# Patient Record
Sex: MALE | Race: WHITE
[De-identification: names, ages, dates, MRNs, and addresses within clinical notes are randomized per-mention and may not be internally consistent; named-entity substitution may affect disease eponyms.]

---

## 2018-03-09 ENCOUNTER — HOSPITAL ENCOUNTER (INPATIENT)
Dept: HOSPITAL 69 - ER | Age: 68
LOS: 3 days | Discharge: HOME HEALTH | DRG: 481 | End: 2018-03-12
Attending: INTERNAL MEDICINE | Admitting: NURSE PRACTITIONER
Payer: COMMERCIAL

## 2018-03-09 LAB
ALBUMIN   *: 4.2 GM/DL (ref 3.4–5)
ALKALINE PHOSPHATASE     *: 84 U/L (ref 50–170)
ALT SERPL-CCNC: 34 U/L (ref 19–67)
ANION GAP SERPL CALC-SCNC: 13 MMOL/L (ref 6.8–13.8)
AST SERPL-CCNC: 20 U/L (ref 0–48)
BILIRUB SERPL-MCNC: 0.9 MG/DL (ref 0–1.1)
BUN SERPL-MCNC: 13 MG/DL (ref 6–23)
BUN/CREAT SERPL: 12.6 (ref 9–21.6)
CA. CORRECTED FOR ALBUMIN: 8.6 MG/DL (ref 8.4–10.2)
CALCIUM       *: 9.1 MG/DL (ref 7.9–10.9)
CHLORIDE SERPL-SCNC: 101 MMOL/L (ref 97–106)
CO2 SERPL-SCNC: 30.4 MMOL/L (ref 24–32.6)
GLUCOSE   *: 113 MG/DL (ref 70–110)
HCT VFR BLD CALC: 43.3 % (ref 42–52)
HGB BLD-MCNC: 15.1 GM/DL (ref 13.5–18)
MCH RBC QN AUTO: 31.9 PG (ref 27–31)
NRBC#: 0 K/MM3 (ref 0–1)
PLATELET # BLD: 259 K/MM3 (ref 150–450)
POTASSIUM SERPL-SCNC: 4.4 MMOL/L (ref 3.4–4.6)
PROT SERPL-MCNC: 7.7 GM/DL (ref 6.2–8.2)
RBC # BLD AUTO: 4.74 M/MM3 (ref 4.7–6)
SODIUM SERPL-SCNC: 140 MMOL/L (ref 132–142)
WBC # BLD AUTO: 13.8 K/MM3 (ref 4–10.5)

## 2018-03-09 RX ADMIN — AMITRIPTYLINE HYDROCHLORIDE SCH MG: 10 TABLET, FILM COATED ORAL at 22:30

## 2018-03-09 NOTE — HP
Chief Complaint





- Chief Complaint


Date of Service: 18


Time of Service: 21:12


Chief Complaint: "Fall, RT hip pain".  Source of HPI- Pt; reliable, ERP report.


History of Present Illness: 





Mr. Pederson is a 67-yr-old WM pt whose PMH is significant for:Kidney Stones & 

Multiple Sclerosis. Pt states that lost his balance and fell while pivoting 

from his wheel chair to the toilet on 3/08/18 at approximately 10.00 am. He 

landed on his RT hip during the fall. He was able to get assistance by family 

but did not seek medical care immediately despite having pain on his RT hip. He 

states that the pain kept getting worse and family convinced him to be taken to 

ER. He went to [facility] on 3/09 where imaging test showed he had a non-displaced 

intertrochanteric RT femur fracture. He was referred to our Othopedic service 

and Dr. Atkins evaluated the pt at the ED, with the plan to proceed with 

Surgery on 3/10 following surgical clearance. Pt has been paraplegic for the 

past 5 yrs due to MS and gets around with a powered wheel chair. Laboratory 

studies at the ED were unremarkable. The EKG showed NSR with no ST-T wave 

changes.The CXR did not have any acute findings. He will be admitted inpatient 

due to surgical procedure that requires preoperative and post-operative cares. 











- Patient's Past Medical History


Patient History - Medical: Kidney stone, Other - Multiple Sclerosis


Patient History - Cardiac/Respiratory: No pertinent hx


Patient History - Cancer: No Hx of Cancer


Patient History - Surgical Procedures: Urology


Patient History - Other: None





- Family History


  ** Mother


Family History - Medical: , No pertinent hx


Family History - Cardiac/Respiratory: No pertinent hx


Family History - Cancer: No pertinent family hx





  ** Father


Family History - Medical: , No pertinent hx


Family History - Cardiac/Respiratory: No pertinent hx


Family History - Cancer: No pertinent family hx





- Social History


Living Situations: spouse


Abuse History: No History of abuse


Psych History: No pertinent hx


Smoking Status: Current every day smoker


Have you smoked in the past 12 months: No


Do you dip or chew tobacco: No


Patient requests Smoking Cessation Consult: No


Initiate information on Smoking Cessation: No


Alcohol Use: none


Drug Use: none





- Immunizations


Immunizations Up to Date: Yes


Hx Pneumococcal Vaccination: No


History of Influenza Vaccine: No





Review Of Systems (GEN)





- Review of Systems


Generalized/Overall Review: Present: Weakness.  Absent: Chills, Fever, Malaise


EENTM: Absent: Eye Pain, Blurred Vision, Tearing


Respiratory: Absent: Cough, Shortness of Breath, Orthopnea


Cardiac: Absent: Chest Pain, Edema, Palpitations, Syncope


Abdominal: Absent: Nausea, Vomiting, Hematemesis, Constipation


Genitourinary: Absent: Burning, Itching, Urgency, Frequency


Musculoskeletal: Absent: Joint Pain, Back Pain, Joint Swelling


Neurological: Present: Parasthesia, Weakness.  Absent: Headache, Anxiety, 

Depressed, Emotional Problems


Skin: Absent: Dryness, Lesions


Endocrine: Present: Intolerance to Cold


Misc: All systems neg except as marked


Allergies/Adverse Reactions: 


 Allergies











Allergy/AdvReac Type Severity Reaction Status Date / Time


 


No Known Allergies Allergy   Unverified 18 18:03











Home Medications: 


HOME MEDICATIONS





Amitriptyline HCl [Elavil] 10 mg PO HS 18 [Last Taken Unknown]


Copaxone 40 mg IM 3XW 18 [Last Taken Unknown]











Exam





- Exam


Vital Signs: 


 Vital Signs - Last Taken











Temp  37.1 C   18 17:33


 


Pulse  88   18 19:00


 


Resp  15   18 19:00


 


BP  143/75   18 19:00


 


Pulse Ox  96   18 19:00











Constitutional: Present: Alert, Oriented x3, Cooperative, No distress


ENT Exam: Present: normal ENT inspection, dry mucous membranes


Eye Exam: bilateral eye: normal inspection, PERRL


Neck: Present: non-tender, full range of motion, supple


Back Exam: Present: normal inspection, no CVA tenderness, no vertebral 

tenderness


Breasts: Present: Exam deferred


Respiratory: Present: No rales, No wheezing


Cardiovascular/Chest: Present: normal peripheral pulses, regular rate, rhythm, 

no chest tenderness, no edema


Abdomen: Present: Normal bowel sounds, soft, nontender


/Rectal: Present: Exam deferred


Skin Exam: Present: warm/dry, no cyanosis


Lymphatic: Present: no adenopathy


Neurologic: Present: alert, oriented x 3, motor weakness - Paralysis on BLE


Appearance: Present: appropriate appearance, appropriate insight


Eye contact: Present: cooperative, good eye contact, normal speech


Thoughts: Present: normal thought pattern, no apparent hallucination


Diagnostic Studies: 


 Laboratory Results











WBC  13.8 K/mm3 (4.0-10.5)  H  18  17:45    


 


RBC  4.74 M/mm3 (4.7-6.0)   18  17:45    


 


Hgb  15.1 gm/dL (13.5-18.0)   18  17:45    


 


Hct  43.3 % (42.0-52.0)   18  17:45    


 


MCV  91.4 fl ()   18  17:45    


 


MCH  31.9 pg (27-31)  H  18  17:45    


 


MCHC  34.9 g/dl (32-36)   18  17:45    


 


RDW  12.6 % (11.5-14.0)   18  17:45    


 


Plt Count  259 K/mm3 (150-450)   18  17:45    


 


MPV  9.6 fl (6.0-9.5)  H  18  17:45    


 


Immature Gran % (Auto)  0.40 % (0.001-0.429)   18  17:45    


 


Immature Gran # (Auto)  0.05 K/mm3 (0.000-0.0310)  H  18  17:45    


 


Neutrophils %  80.7 % (42-75.0)  H  18  17:45    


 


Lymphocytes %  9.6 % (20-51)  L  18  17:45    


 


Monocytes %  8.9 % (0.0-9)   18  17:45    


 


Eosinophils %  0.1 % (0.0-3.0)   18  17:45    


 


Basophils %  0.3 % (0.0-1.0)   18  17:45    


 


Nucleated RBC %  0.0 k/mm3 (0-1)   18  17:45    


 


Neutrophils #  11.1 K/mm3 (1.3-6.0)  H  18  17:45    


 


Lymphocytes #  1.33 k/mm3 (1.5-3.5)  L  18  17:45    


 


Monocytes #  1.2 k/mm3 (0.0-1.0)  H  18  17:45    


 


Eosinophils #  0.0 k/mm3 (0.0-0.7)   18  17:45    


 


Absolute Basophils  0.0 k/mm3 (0.0-0.1)   18  17:45    


 


Sodium  140 mmol/L (132-142)   18  17:45    


 


Plasma Sodium  140 mmol/L (130-142)   18  17:45    


 


Potassium  4.4 mmol/L (3.4-4.6)   18  17:45    


 


Chloride  101 mmol/L ()   18  17:45    


 


Carbon Dioxide  30.4 mmol/L (24-32.6)   18  17:45    


 


Anion Gap  13.0 mmol/L (6.8-13.8)   18  17:45    


 


BUN  13 mg/dL (6-23)   18  17:45    


 


Creatinine  1.03 mg/dL (0.4-1.4)   18  17:45    


 


Est GFR (Non-Af Amer)  77 mL/min ()   18  17:45    


 


BUN/Creatinine Ratio  12.6  (9.0-21.6)   18  17:45    


 


Random Glucose  113 mg/dL ()  H  18  17:45    


 


Calcium  9.1 mg/dL (7.9-10.9)   18  17:45    


 


Calcium Adj for Albumin  8.6 mg/dL (8.4-10.2)   18  17:45    


 


Total Bilirubin  0.9 mg/dL (0.0-1.1)   18  17:45    


 


AST  20 U/L (0-48)   18  17:45    


 


ALT  34 U/L (19-67)   18  17:45    


 


Alkaline Phosphatase  84 U/L ()   18  17:45    


 


Troponin I  Less than 0.017 ng/ml (0.00-0.10)   18  17:45    


 


Total Protein  7.7 gm/dL (6.2-8.2)   18  17:45    


 


Albumin  4.2 gm/dl (3.4-5.0)   18  17:45    














Assessment/Plan





- Assessment/Plan


(1) Nondisplaced intertrochanteric fracture of femur


Assessment: 


Pt is a 67 yr male pt who suffered a nondisplaced RT hip fracture on . Pt 

has known hx of MS and has been paraplegic x 5 yrs. He has been seen and eval 

by Ortho and plan is to proceed with surgery once medically cleared. Pt has no 

other pertinent medical history aside from MS. He has no known CAD. He has been 

a smoker for 50 yrs. Parents lived well over their 80s without any cardiac 

history. The EKG and troponin did not have any signs of ACS/MI. The CXR did not 

have any acute findings also. According to the RCRI score, he is Class II which 

carries a 0.9% risk of major cardiac event omega/post operatively. He is clear 

to proceed with surgery as the benefits of procedure outweighs the risks of not 

performing and delays of hip fracture surgery following injury are associated 

with higher mortality. 





Will provide supportive cares with IVF hydration, Pain management, 

immobilization of affected extremity.     


Problem: Acute   


Qualifiers: 


   Laterality: right 





(2) Multiple sclerosis


Assessment: 


Stable- Follows up with Urology at the [facility]. Continue Copaxone.


Problem: Chronic

## 2018-03-09 NOTE — CONS
[facility] [place]





- General


Date of Service: 03/09/18


Narrative: 





Mr. Pederson is a 67-year-old gentleman who presented to an outside emergency 

department secondary to 24-36 hours worth of right hip pain after a ground-

level fall in the bathroom.  He reports he is a limited ambulator secondary to 

multiple sclerosis but denies any prior hip pain or other areas of pain at this 

time.  Films were obtained at the outside emergency department showed a 

nondisplaced intertrochanteric femur fracture on the right-hand side.  He was 

transferred to our facility for further care.  He lives with assistance but 

otherwise is a household ambulator.  He denies any prior surgical procedures to 

the hip or pelvis.


Source: patient





- History of Present Illness


Timing/Duration: 24 hours


Severity: mild


Modifying Factors - (Worsens): Reports: movement


Modifying Factors - (Improves): Reports: immobilization


Associated Symptoms: denies symptoms


Allergies/Adverse Reactions: 


Allergies





No Known Allergies Allergy (Unverified 03/09/18 18:03)


 








Home Medications: 


Home Medications











 Medication  Instructions  Recorded  Last Taken


 


Amitriptyline HCl [Elavil] 10 mg PO HS 03/09/18 Unknown


 


Copaxone 40 mg IM 3XW 03/09/18 Unknown














- Patient's Past Medical History


Patient History - Medical: No pertinent hx


Patient History - Cardiac/Respiratory: No pertinent hx


Patient History - Cancer: No Hx of Cancer


Patient History - Surgical Procedures: Urology


Patient History - Other: None





- Social History


Living Situations: spouse


Abuse History: No History of abuse


Psych History: No pertinent hx


Smoking Status: Current every day smoker


Have you smoked in the past 12 months: No


Do you dip or chew tobacco: No


Patient requests Smoking Cessation Consult: No


Initiate information on Smoking Cessation: No


Alcohol Use: none


Drug Use: none





- Immunizations


Immunizations Up to Date: Yes


Hx Pneumococcal Vaccination: No


History of Influenza Vaccine: No





Review of Systems





- Review of Systems


Neurological: Absent: Numbness, Parasthesia, Tingling





Physical Examination





- Exam


Narrative: 





Is normocephalic/atraumatic





Right lower extremity: No gross deformity.  Palpable dorsalis pedis pulse.  He 

is able to flex and extend his toes and ankle.  He has mild pain with log roll 

of his leg.  He has no significant ecchymosis, abrasions, or lacerations to his 

hip.  Sensation is intact light touch.  He has no scars


Vital Signs: 


 Vital Signs - Last Taken











Temp  37.1 C   03/09/18 17:33


 


Pulse  89   03/09/18 17:33


 


Resp  14   03/09/18 17:33


 


BP  129/84   03/09/18 17:33


 


Pulse Ox  97   03/09/18 17:33








 O2











Oxygen Delivery Method         Room Air














Constitutional: Present: Alert, Oriented x3





- Results and Findings:


Narrative: 





Outside films: AP pelvis 2 views of right hip: Nondisplaced intertrochanteric 

femur fracture with no appreciable implants.  No other acute findings


Lab/Microbiology results last 24 hrs: 


Abnormal/Pending Laboratory Last 24 HRS











  03/09/18 03/09/18





  17:45 17:45


 


WBC   13.8 H


 


MCH   31.9 H


 


MPV   9.6 H


 


Immature Gran # (Auto)   0.05 H


 


Neutrophils %   80.7 H


 


Lymphocytes %   9.6 L


 


Neutrophils #   11.1 H


 


Lymphocytes #   1.33 L


 


Monocytes #   1.2 H


 


Random Glucose  113 H 














- Assessments/Findings


(1) Hip fracture


Diagnosis(s): 


I discussed with patient the risks and benefits alternatives of surgery.  He 

will be nothing by mouth after midnight.  Plan is for intramedullary fixation 

tomorrow.  He'll be admitted to medicine and as long as he is medically 

optimized we will perform the surgery.  The recovery as well as nonsurgical 

options were discussed.  He will receive IV Ancef preoperatively.  He will need 

6 weeks of DVT prophylaxis postoperatively.


Problem: Acute   


Qualifiers: 


   Encounter type: initial encounter   Fracture type: closed   Laterality: 

right   Qualified Code(s): S72.001A - Fracture of unspecified part of neck of 

right femur, initial encounter for closed fracture

## 2018-03-09 NOTE — ERNOTE
Hip Pain HPI





- Narrative


Date of Service: 03/09/18





- General


Time Seen by Provider: 03/09/18 17:26


Source: patient


Exam Limitations: no limitations





- History of Present Illness


Initial Comments: 





Pt. comes in with c/o R hip pain after he fell reaching over for something and 

landing on his R hip..  Pt. denies any numbness and tingling, SOB, or CP.  Pt. 

went to the [place] ER who consulted the orthopedic surgeon here who accepted 

pt. for transfer.  Pt. denies any alleviating factors but states that movement 

causes pain but denies any pain at rest.


Timing/Duration: yesterday


Severity: moderate


Hip Pain Location: hip (R)


Method of Injury/Prior Injury: fell


Modifying Factors - (Improves): Reports: rest


Modifying Factors - (Worsens): Reports: movement


Associated Symptoms: denies symptoms





- Immun/Allergies/Home Medications


Allergies/Adverse Reactions: 


 Allergies











Allergy/AdvReac Type Severity Reaction Status Date / Time


 


No Known Allergies Allergy   Unverified 03/09/18 18:03











Home Medications: 


 Ambulatory Orders











 Medication  Instructions  Recorded


 


Amitriptyline HCl [Elavil] 10 mg PO HS 03/09/18


 


Copaxone 40 mg IM 3XW 03/09/18














Review of Systems





- Review of Systems


Constitutional: Present: no symptoms reported.  Absent: fatigue, recent illness

, sweating, weakness


EENTM: Present: no symptoms reported


Respiratory: Present: no symptoms reported.  Absent: cough, orthopnea, short of 

breath


Cardiology: Present: no symptoms reported.  Absent: chest pain, edema, 

palpitations


Gastrointestinal/Abdominal: Present: no symptoms reported.  Absent: abdominal 

pain, nausea, vomiting


Genitourinary: Present: no symptoms reported.  Absent: dysuria


Musculoskeletal: Present: joint pain - R hip.  Absent: back pain, neck pain


Skin: Present: no symptoms reported.  Absent: lesions, lumps, rash


Neurological: Present: no symptoms reported.  Absent: headache, numbness, 

tingling


Endocrine: Present: no symptoms reported


All Other Systems: All systems neg except as marked





- Patient's Past Medical History


Patient History - Medical: No pertinent hx





Pain Exam





- Physical Exam


General Appearance: Present: WD/WN, no apparent distress


Eyes, Ears, Nose, Throat Exam: Present: normal ENT inspection, TMs normal, 

pharynx normal


Neck Exam: Present: non-tender


Cardiovascular/Respiratory: Present: regular rate, rhythm, no M/R/G, normal 

peripheral pulses, normal breath sounds, no respiratory distress


Gastrointestinal/Abdominal: Present: normal bowel sounds, non tender


Back Exam: Present: normal inspection, no CVA tenderness, no vertebral 

tenderness


Extremity Exam: Present: no pedal edema, bony-point tenderness, pain with 

movement, tenderness - R lateral hip.  Absent: pedal edema


Neurologic: Present: CNs II-XII nml as tested, no motor/sensory deficits, alert

, normal mood/affect, oriented x 3, other - ataxic movement.  Absent: normal 

cerebellar test


Skin Exam: Present: normal color, warm/dry, no cyanosis.  Absent: pallor, skin 

rash





ED Progress





- Date and Time Seen:


Date and Time: 





03/09/18 19:43


Discussed with winnie and she agrees to admit pt. on acute admission with ortho 

consult





- PROGRESS/REASSESSMENT


Condition: Unchanged





- VITAL SIGNS


Patient's Vital Signs:: I have reviewed the patient's vital signs.





- RESULTS AND ORDERS


Patient's Lab Results:: I have reviewed the patient's lab results.





- EKG


  ** EKG #1


EKG: NSR, nonspecific ST T wave chg


EKG Read: Interp. by me





- X-Ray


  ** X-Ray #1


XRAY: chest


X-Ray Interpretation: Interp. by me


X-Ray Comments: 





No consolidation no cardiomegaly





Departure





- Departure


Clinical Impression: 


Hip fracture


Qualifiers:


 Encounter type: initial encounter Fracture type: closed Laterality: right 

Qualified Code(s): S72.001A - Fracture of unspecified part of neck of right 

femur, initial encounter for closed fracture





Disposition: Still a patient


Condition: Good

## 2018-03-10 LAB
ANION GAP SERPL CALC-SCNC: 12.4 MMOL/L (ref 6.8–13.8)
BUN SERPL-MCNC: 14 MG/DL (ref 6–23)
BUN/CREAT SERPL: 14.9 (ref 9–21.6)
CALCIUM       *: 8.5 MG/DL (ref 7.9–10.9)
CHLORIDE SERPL-SCNC: 103 MMOL/L (ref 97–106)
CO2 SERPL-SCNC: 28.7 MMOL/L (ref 24–32.6)
ESTIMATED CREAT CLEAR: 74.8
GLUCOSE   *: 104 MG/DL (ref 70–110)
HCT VFR BLD CALC: 40.2 % (ref 42–52)
HGB BLD-MCNC: 13.8 GM/DL (ref 13.5–18)
INR PPP: 1 INR (ref 0.9–1.1)
MCH RBC QN AUTO: 31.7 PG (ref 27–31)
NRBC#: 0 K/MM3 (ref 0–1)
PLATELET # BLD: 245 K/MM3 (ref 150–450)
POTASSIUM SERPL-SCNC: 4.1 MMOL/L (ref 3.4–4.6)
RBC # BLD AUTO: 4.36 M/MM3 (ref 4.7–6)
SODIUM SERPL-SCNC: 140 MMOL/L (ref 132–142)
WBC # BLD AUTO: 10.1 K/MM3 (ref 4–10.5)

## 2018-03-10 PROCEDURE — 0QH646Z INSERTION OF INTRAMEDULLARY INTERNAL FIXATION DEVICE INTO RIGHT UPPER FEMUR, PERCUTANEOUS ENDOSCOPIC APPROACH: ICD-10-PCS | Performed by: ORTHOPAEDIC SURGERY

## 2018-03-10 RX ADMIN — AMITRIPTYLINE HYDROCHLORIDE SCH MG: 10 TABLET, FILM COATED ORAL at 21:54

## 2018-03-10 RX ADMIN — KETOROLAC TROMETHAMINE SCH MG: 15 INJECTION, SOLUTION INTRAMUSCULAR; INTRAVENOUS at 10:23

## 2018-03-10 RX ADMIN — CEFAZOLIN SODIUM SCH MLS/HR: 10 POWDER, FOR SOLUTION INTRAVENOUS at 18:41

## 2018-03-10 RX ADMIN — CEFAZOLIN SODIUM SCH MLS/HR: 10 POWDER, FOR SOLUTION INTRAVENOUS at 14:37

## 2018-03-10 RX ADMIN — KETOROLAC TROMETHAMINE SCH MG: 15 INJECTION, SOLUTION INTRAMUSCULAR; INTRAVENOUS at 21:53

## 2018-03-10 RX ADMIN — WARFARIN SODIUM SCH MG: 2.5 TABLET ORAL at 16:31

## 2018-03-10 RX ADMIN — OXYCODONE HYDROCHLORIDE AND ACETAMINOPHEN PRN TAB: 5; 325 TABLET ORAL at 14:37

## 2018-03-10 RX ADMIN — SODIUM CHLORIDE, SODIUM LACTATE, POTASSIUM CHLORIDE, CALCIUM CHLORIDE AND DEXTROSE MONOHYDRATE PRN MLS/HR: 5; 600; 310; 30; 20 INJECTION, SOLUTION INTRAVENOUS at 10:21

## 2018-03-10 RX ADMIN — OXYCODONE HYDROCHLORIDE AND ACETAMINOPHEN PRN TAB: 5; 325 TABLET ORAL at 18:40

## 2018-03-10 RX ADMIN — STANDARDIZED SENNA CONCENTRATE AND DOCUSATE SODIUM SCH TAB: 8.6; 5 TABLET, FILM COATED ORAL at 21:54

## 2018-03-10 RX ADMIN — KETOROLAC TROMETHAMINE SCH MG: 15 INJECTION, SOLUTION INTRAMUSCULAR; INTRAVENOUS at 16:26

## 2018-03-10 RX ADMIN — SODIUM CHLORIDE, SODIUM LACTATE, POTASSIUM CHLORIDE, CALCIUM CHLORIDE AND DEXTROSE MONOHYDRATE PRN MLS/HR: 5; 600; 310; 30; 20 INJECTION, SOLUTION INTRAVENOUS at 18:41

## 2018-03-10 NOTE — OR
Operative Report





- Dictated Report


Narrative: 





Date: 03/10/2018





Surgeon: Abdifatah Aldana M.D.





Assistant: None





Preoperative diagnosis: Closed nondisplaced right Intertrochanteric femur 

fracture





Postoperative diagnosis: Closed nondisplaced right Intertrochanteric femur 

fracture





Operations and procedures:


1.  Closed reduction, cephalo-medullary fixation right intertrochanteric femur 

fracture


2.  Intraoperative interpretation of radiographs





Anesthesia: Spinal





Specimens: None





Estimated blood loss: 50 Milliliters





Retained implants: [employer] Trigen InterTAN 130 degree size 11.5 mm by 18 

centimeter nail with 105 millimeter lag screw and 100 millimeter compression 

screw, with distal locking screw





Complications: None





Indications for procedure:


Mr. Pederson is a 67-year-old gentleman with multiple sclerosis who injured the 

right leg after ground-level fall at home.  They were admitted to the hospital 

after being evaluated in the emergency department.  Once the medical provider 

felt that they were stable for surgical treatment, the risks and benefits 

alternatives were discussed.  The risks of death, blood clots, bleeding, 

infection, nerve/tendon/blood vessel injury, malunion, nonunion, failure of 

implants, painful implants, arthrosis, and need for additional procedures were 

discussed.  The extremity was marked and consent was obtained on the floor.





Procedure:


After marking the operative extremity on the floor, the patient was taken to 

the operating room.  A timeout was performed.  IV antibiotics consisting of 

Ancef were administered.  A spinal anesthetic was induced by anesthesia, and 

the patient was then placed onto a fracture table with a well-padded perineal 

post.  The nonoperative leg was placed in a well-padded traction boot in slight 

extension without any traction with an SCD on the leg.  The operative leg was 

placed in a well-padded traction boot.  Minimal Longitudinal traction, internal 

rotation, and flexion were utilized in order to maintain the reduction of the 

fracture.  Preliminary images were attained utilizing C-arm in both the AP and 

lateral views.  This confirmed that we had obtained adequate visualization of 

the fracture as well as reduction.  Next the hip was then prepped and draped in 

a standard sterile fashion.





Next the guidewire was placed percutaneously proximal to the greater trochanter 

to rajani a starting point at the tip of the greater trochanter centered on the 

lateral view.  This was passed down to the level below the lesser trochanter.  

A scalpel was utilized in order to dissect down to the greater trochanter in 

order to place the soft tissue protector down to bone.  The entry drill was 

then placed down the proximal femur to the level of the lesser trochanter.  The 

proper size nail was then selected and impacted into place.  The outrigger was 

utilized in order to confirm the appropriate depth of the nail.





Using the alignment device on the outrigger, an incision was made over the 

lateral femur.  Sharp dissection was carried through the iliotibial band down 

to the proximal femur.  The guidewire was placed into the femoral head in a 

center-center position on AP and lateral views.  The tip-apex distance of less 

than 25 mm combined was obtained.  Once we felt that we had placed a guidewire 

in the appropriate position, it was measured.  Next the compression screw site 

was drilled through the lateral femoral cortex.  This was then drilled down to 

the appropriate depth, again confirming that we are within the confines the 

bone.  The derotational bar was then placed and the lag screw was drilled.





The lag screw was then secured in place seating fully ensuring that we were 

within the confines of the bone.  The compression screw was then inserted 

allowing for compression while releasing the traction on the leg.  Using C-arm 

this was visualized to allow for compression across the fracture site.  Once is 

felt that we had adequately stabilized the intertrochanteric fracture, the 

distal interlocking screw was placed in a dynamic position.  It was confirmed 

to be the appropriate length and within the nail on both AP and lateral views.  

The nail was secured allowing for controlled compression and the outrigger was 

removed.  The wounds were then thoroughly irrigated.  Final images were 

obtained.  The hip was placed through range of motion and showed no crepitance.

  The deep fascia was closed with 0 Vicryl, the subcutaneous tissue with 3-0 

Vicryl, and the skin was closed with staples.  Sterile dressings of Xeroform, 4 

x 4, and tape were applied.  All sponge, sharp, and instrument counts were 

correct prior to closing the wounds.  The patient was then awoken and 

transferred to the postanesthesia care unit in stable condition.

## 2018-03-10 NOTE — POSTOP NO
Date of Surgery: 03/10/18


Patient Tolerated the Procedure: Well


Post Operative Diagnosis/Procedures: 


Assistant: None





Post-operative Diagnosis: Nondisplaced right intertrochanteric femur fracture





Finding: Above





Procedure: Closed reduction cephalo-medullary fixation of right 

intertrochanteric femur fracture





Estimated Blood Loss: 50 mL





Specimens: None

## 2018-03-10 NOTE — PN
Subjective





- Date and Time Seen


Date: 03/10/18


Time: 05:30


Subjective Narrative: 





Pt examined this am. He is in no distress. Pain is well controlled with 

Dilaudid. Anticipating surgery today. No acute events overnight. 





Objective





- Vitals


Vitals: 


 Last Vital Signs











Temp  36.9 C   03/10/18 03:17


 


Pulse  97   03/10/18 03:17


 


Resp  18   03/10/18 03:17


 


BP  129/78   03/10/18 03:17


 


Pulse Ox  94   03/10/18 03:17














- Exam


Constitutional: Present: Alert, Oriented x3, Cooperative, No distress


ENT Exam: Present: normal ENT inspection, hearing grossly normal


Neck: Present: non-tender, full range of motion, supple


Breasts: Present: Exam deferred


Respiratory: Present: No rales, No wheezing


Cardiovascular/Chest: Present: normal peripheral pulses, regular rate, rhythm, 

no chest tenderness, no edema


Abdomen: Present: Normal bowel sounds, soft, nontender


/Rectal: Present: Exam deferred


Extremity: Present: no pedal edema - Paralysis on BLE., other


Skin Exam: Present: warm/dry, no cyanosis


Lymphatic: Present: no adenopathy


Neurologic: Present: alert, normal mood/affect, motor weakness - BLE


Appearance: Present: appropriate appearance, appropriate insight


Eye contact: Present: cooperative, good eye contact, normal speech


Thoughts: Present: normal thought pattern, no apparent hallucination





Cauti Physician Documentation





- Urinary Catheter Management


  ** Urethral (Subramanian)


Date of Insertion: 03/10/18


Time of Insertion: 04:45





Assessment/Plan





- Problems/Diagnosis


(1) Nondisplaced intertrochanteric fracture of femur


Problem: Acute   


Qualifiers: 


   Laterality: right 


Narrative: 


Pt is a 67 yr male pt who suffered a nondisplaced RT hip fracture on 03/08. Pt 

has known hx of MS and has been paraplegic x 5 yrs. He has been seen and eval 

by Ortho and plan is to proceed with surgery once medically cleared. Pt has no 

other pertinent medical history aside from MS. He has no known CAD. He has been 

a smoker for 50 yrs. Parents lived well over their 80s without any cardiac 

history. The EKG and troponin did not have any signs of ACS/MI. The CXR did not 

have any acute findings also. According to the RCRI score, he is Class II which 

carries a 0.9% risk of major cardiac event omega/post operatively. He is clear 

to proceed with surgery as the benefits of procedure outweighs the risks of not 

performing and delays of hip fracture surgery following injury are associated 

with higher mortality. 





Will provide supportive cares with IVF hydration, Pain management, 

immobilization of affected extremity. 








(2) Multiple sclerosis


Problem: Chronic   


Narrative: 


Stable- has had it for 20 yrs. became wheelchair bound 5 yrs ago. Follows -up 

with neurology at the [facility]. Continue Copaxone

## 2018-03-11 LAB
ANION GAP SERPL CALC-SCNC: 10.7 MMOL/L (ref 6.8–13.8)
BUN SERPL-MCNC: 12 MG/DL (ref 6–23)
BUN/CREAT SERPL: 12.4 (ref 9–21.6)
CALCIUM       *: 8.2 MG/DL (ref 7.9–10.9)
CHLORIDE SERPL-SCNC: 105 MMOL/L (ref 97–106)
CO2 SERPL-SCNC: 28.5 MMOL/L (ref 24–32.6)
ESTIMATED CREAT CLEAR: 72.5
GLUCOSE   *: 112 MG/DL (ref 70–110)
HCT VFR BLD CALC: 34.3 % (ref 42–52)
HGB BLD-MCNC: 11.7 GM/DL (ref 13.5–18)
INR PPP: 1.06 INR (ref 0.9–1.1)
MCH RBC QN AUTO: 31.5 PG (ref 27–31)
PLATELET # BLD: 183 K/MM3 (ref 150–450)
POTASSIUM SERPL-SCNC: 4.2 MMOL/L (ref 3.4–4.6)
RBC # BLD AUTO: 3.71 M/MM3 (ref 4.7–6)
SODIUM SERPL-SCNC: 140 MMOL/L (ref 132–142)
WBC # BLD AUTO: 10.1 K/MM3 (ref 4–10.5)

## 2018-03-11 RX ADMIN — KETOROLAC TROMETHAMINE SCH MG: 15 INJECTION, SOLUTION INTRAMUSCULAR; INTRAVENOUS at 04:08

## 2018-03-11 RX ADMIN — KETOROLAC TROMETHAMINE SCH MG: 15 INJECTION, SOLUTION INTRAMUSCULAR; INTRAVENOUS at 09:33

## 2018-03-11 RX ADMIN — KETOROLAC TROMETHAMINE SCH MG: 15 INJECTION, SOLUTION INTRAMUSCULAR; INTRAVENOUS at 17:26

## 2018-03-11 RX ADMIN — AMITRIPTYLINE HYDROCHLORIDE SCH MG: 10 TABLET, FILM COATED ORAL at 21:09

## 2018-03-11 RX ADMIN — CEFAZOLIN SODIUM SCH MLS/HR: 10 POWDER, FOR SOLUTION INTRAVENOUS at 00:27

## 2018-03-11 RX ADMIN — OXYCODONE HYDROCHLORIDE AND ACETAMINOPHEN PRN TAB: 5; 325 TABLET ORAL at 17:32

## 2018-03-11 RX ADMIN — KETOROLAC TROMETHAMINE SCH MG: 15 INJECTION, SOLUTION INTRAMUSCULAR; INTRAVENOUS at 23:10

## 2018-03-11 RX ADMIN — STANDARDIZED SENNA CONCENTRATE AND DOCUSATE SODIUM SCH TAB: 8.6; 5 TABLET, FILM COATED ORAL at 21:10

## 2018-03-11 RX ADMIN — ENOXAPARIN SODIUM SCH MG: 40 INJECTION SUBCUTANEOUS at 09:34

## 2018-03-11 RX ADMIN — WARFARIN SODIUM SCH MG: 2.5 TABLET ORAL at 17:26

## 2018-03-11 NOTE — PN
Subjective





- Date and Time Seen


Date: 03/11/18


Time: 16:03


Subjective Narrative: 





No concerns.  He reports he slept well.  Pain is well-controlled.  He got up to 

a chair with nursing.  He again reports that he is a limited ambulator and at 

home very much transfers and has been getting to the bathroom but otherwise is 

fairly limited other than this.





Objective





- Vitals


Vitals: 


 Last Vital Signs











Temp  37.0 C   03/11/18 11:00


 


Pulse  96   03/11/18 14:00


 


Resp  16   03/11/18 11:00


 


BP  136/75   03/11/18 11:00


 


Pulse Ox  94   03/11/18 11:00














- Abnormal Lab Findings


Abnormal Lab Findings: 


 Abnormal Lab Results











  03/11/18 03/11/18 Range/Units





  05:05 05:05 


 


RBC  3.71 L   (4.7-6.0)  M/mm3


 


Hgb  11.7 L   (13.5-18.0)  gm/dL


 


Hct  34.3 L   (42.0-52.0)  %


 


MCH  31.5 H   (27-31)  pg


 


Random Glucose   112 H  ()  mg/dL














- Exam


Exam Narrative: 





Right lower extremity: Thigh soft.  Sensation intact to light touch.  Able to 

flex and extend toes and ankle.  Dressings dry.


Constitutional: Present: Alert, Oriented x3





Cauti Physician Documentation





- Urinary Catheter Management


  ** Urethral (Subramanian)


Date of Insertion: 03/10/18


Time of Insertion: 04:45


Date of Removal: 03/11/18


Time of Removal: 07:00





Assessment/Plan





- Problems/Diagnosis


(1) Hip fracture


Problem: Acute   


Qualifiers: 


   Encounter type: subsequent encounter   Fracture type: closed   Laterality: 

right   Fracture healing: with routine healing   Qualified Code(s): S72.001D - 

Fracture of unspecified part of neck of right femur, subsequent encounter for 

closed fracture with routine healing   


Narrative: 


.  Continue weightbearing as tolerated with range of motion as tolerated.  He 

will need 6 weeks of DVT prophylaxis.  He is to keep his wounds clean and dry.  

Cover with dry gauze and tape.  When he is stable it is okay from an orthopedic 

standpoint to discharge to home or to a skilled therapy depending on his 

progression with physical therapy

## 2018-03-11 NOTE — PN
Subjective





- Date and Time Seen


Date: 03/11/18


Time: 06:33


Subjective Narrative: 


Pt seen this am. Has no major complaints. Is worried about getting back to 

functional baseline. Pain is well controlled. No other acute events overnight. 





Objective





- Vitals


Vitals: 


 Last Vital Signs











Temp  37.2 C   03/11/18 04:20


 


Pulse  82   03/11/18 04:20


 


Resp  18   03/11/18 04:20


 


BP  117/62   03/11/18 04:20


 


Pulse Ox  92   03/11/18 04:20














- Abnormal Lab Findings


Abnormal Lab Findings: 


 Abnormal Lab Results











  03/10/18 03/11/18 03/11/18 Range/Units





  05:00 05:05 05:05 


 


RBC  4.36 L  3.71 L   (4.7-6.0)  M/mm3


 


Hgb   11.7 L   (13.5-18.0)  gm/dL


 


Hct  40.2 L  34.3 L   (42.0-52.0)  %


 


MCH  31.7 H  31.5 H   (27-31)  pg


 


MPV  9.8 H    (6.0-9.5)  fl


 


Immature Gran # (Auto)  0.04 H    (0.000-0.0310)  K/mm3


 


Lymphocytes %  18.5 L    (20-51)  %


 


Monocytes %  13.8 H    (0.0-9)  %


 


Neutrophils #  6.6 H    (1.3-6.0)  K/mm3


 


Monocytes #  1.4 H    (0.0-1.0)  k/mm3


 


Random Glucose    112 H  ()  mg/dL














- Exam


Constitutional: Present: Alert, Oriented x3, Cooperative, No distress


ENT Exam: Present: normal ENT inspection, moist mucous membranes.  Absent: 

nasal drainage


Neck: Present: non-tender, full range of motion, supple


Breasts: Present: Exam deferred


Respiratory: Present: No rales, No wheezing


Cardiovascular/Chest: Present: normal peripheral pulses, regular rate, rhythm, 

no chest tenderness


Abdomen: Present: Normal bowel sounds, soft, nontender


/Rectal: Present: Exam deferred


Extremity: Present: other - Paralysis on BLE.


Skin Exam: Present: warm/dry, other - Surgical site RT hip- intact


Lymphatic: Present: no adenopathy


Neurologic: Present: alert, normal mood/affect


Appearance: Present: appropriate appearance, appropriate insight


Eye contact: Present: cooperative, good eye contact, normal speech


Thoughts: Present: normal thought pattern, no apparent hallucination





Cauti Physician Documentation





- Urinary Catheter Management


  ** Urethral (Subramanian)


Date of Insertion: 03/10/18


Time of Insertion: 04:45





Assessment/Plan





- Problems/Diagnosis


(1) Nondisplaced intertrochanteric fracture of femur


Problem: Acute   


Qualifiers: 


   Laterality: right 


Narrative: 


POD # 1 for closed reduction and fixation of femur fx. Follow ortho 

recommendations: Anticoagulation, pain mgt, bowel regimen and PT. Monitor CBC. 








(2) Multiple sclerosis


Problem: Chronic   


Narrative: 


Stable- has had it for 20 yrs. became wheelchair bound 5 yrs ago. Follows -up 

with neurology at the [facility]. Continue Copaxone

## 2018-03-12 VITALS — SYSTOLIC BLOOD PRESSURE: 157 MMHG | DIASTOLIC BLOOD PRESSURE: 80 MMHG

## 2018-03-12 LAB — INR PPP: 1.11 INR (ref 0.9–1.1)

## 2018-03-12 RX ADMIN — OXYCODONE HYDROCHLORIDE AND ACETAMINOPHEN PRN TAB: 5; 325 TABLET ORAL at 14:31

## 2018-03-12 RX ADMIN — KETOROLAC TROMETHAMINE SCH MG: 15 INJECTION, SOLUTION INTRAMUSCULAR; INTRAVENOUS at 04:28

## 2018-03-12 RX ADMIN — ENOXAPARIN SODIUM SCH MG: 40 INJECTION SUBCUTANEOUS at 08:29

## 2018-03-12 NOTE — PN
Subjective





- Date and Time Seen


Date: 03/12/18


Subjective Narrative: 





No concerns.  Pain is well-controlled.  He worked on transfers with PT.  He 

again reports that he is a limited ambulator and at home very much transfers 

and has been getting to the bathroom but otherwise is fairly limited other than 

this.





Objective





- Vitals


Vitals: 


 Last Vital Signs











Temp  36.7 C   03/12/18 10:29


 


Pulse  98   03/12/18 10:29


 


Resp  16   03/12/18 10:29


 


BP  137/79   03/12/18 10:29


 


Pulse Ox  93   03/12/18 10:29














- Abnormal Lab Findings


Abnormal Lab Findings: 


 Abnormal Lab Results











  03/12/18 Range/Units





  05:20 


 


PT  11.1 H  (9.0-11.0)  Seconds


 


INR (Anticoag Therapy)  1.11 H  (0.90-1.10)  INR














- Exam


Exam Narrative: 





Exam stable - RLE - NVI, wounds benign, dressings with minimal drainage


Constitutional: Present: Alert, Oriented x3





Cauti Physician Documentation





- Urinary Catheter Management


  ** Urethral (Subramanian)


Date of Insertion: 03/10/18


Time of Insertion: 04:45


Date of Removal: 03/11/18


Time of Removal: 07:00





Assessment/Plan


Plan Narrative: 





OK to DC home.  Continue WBAT, ROM as christina.  Keep wounds dry, cover with dry 

gauze and tape.  Follow-up 2 weeks.  Will need 6 weeks anticoagulation





- Problems/Diagnosis


(1) Hip fracture


Problem: Acute   


Qualifiers: 


   Qualified Code(s): S72.001D - Fracture of unspecified part of neck of right 

femur, subsequent encounter for closed fracture with routine healing

## 2018-03-12 NOTE — PN
Progess Note - Interim


Date: 03/12/18


Time: 08:46


Narrative: 





03/12/18 08:46


For PT/OT evaluation today. possible discharge today if PT feels he is safe to 

transfer.


03/12/18 10:11


Patient was evaluated by PT and recommends a 2 wheeled walker for transfer.

## 2018-03-12 NOTE — DS
(1) Nondisplaced intertrochanteric fracture of femur


Problem: Acute   


Qualifiers: 


   Fracture type: closed   Laterality: right 





(2) UTI (urinary tract infection)


Problem: Acute   





(3) Multiple sclerosis


Problem: Chronic   


Description of Stay: 





Lester Pederson is a 67-yr-old WM pt whose PMH is significant for:Kidney Stones & 

Multiple Sclerosis who was admitted on 3/9/32204 for right hip pain. Pt  lost 

his balance and fell while pivoting from his wheel chair to the toilet on 3/08/

18 at approximately 10.00 am. He landed on his RT hip during the fall. He was 

able to get assistance by family but did not seek medical care immediately 

despite having pain on his RT hip. He states that the pain kept getting worse 

and family convinced him to be taken to ER. He went to [facility] on 3/09 where 

imaging test showed he had a non-displaced intertrochanteric RT femur fracture. 

He was referred to our Othopedic service and Dr. Atkins evaluated the pt at 

the ED, with the plan to proceed with Surgery on 3/10 following surgical 

clearance. Pt has been paraplegic for the past 5 yrs due to MS and gets around 

with a powered wheel chair. Laboratory studies at the ED were unremarkable. The 

EKG showed NSR with no ST-T wave changes.The CXR did not have any acute 

findings. He underwent CRIF of his rhight intertrochanteric fracture. He jose l 

not ambulate/walk but mostly just transfers from his WC or walker . PT 

evaluated him and recommended a 2 wheeled walker as he only has a 4 wheeled 

walker. He is now stable to be discharged but needs to be on his 

anticoagulation for at least 6 weeks.


Procedures Performed: see notes below


Discharge Location: Home


Disposition: Home Health Service


Condition: Good


Discharge Activity: Activity as tolerated


Discharge Diet: General/regular food


Problem Oriented Discharge Instructions to Patient/Family:  Total Hip 

Replacement, Easy-to-Read, Total Hip Replacement, Care After, Easy-to-Read


Additional Patient Instructions (free text): 


[facility]. Please call report and fax orders upon discharge. ph:[phone]


3/15/18 at 10:30am with Dr León. Please arrive 15 minutes early to fill out 

paperwork. 


Fax records from this stay. to [facility] at [phone].


INR to be drawn by home health. Call results to Dr León at [phone]. 

Follow up with Orthopedics.


Follow up with Dr. Aldana 3/22 at 9:45.


Weight bearing as tolerated, Range of Motion as tolerated.


Keep wounds dry- cover with dry gauze and tape.


Prescriptions (Any new or edited meds): 


Acetaminophen [Tylenol] 1,000 mg PO Q6H PRN #30 tablet


 PRN Reason: Mild Pain (Pain Scale 1-3)


Ciprofloxacin HCl [Cipro] 500 mg PO BID #14 tablet


Enoxaparin Sodium [Lovenox] 40 mg SC Q24H #7 disp.syrin


oxyCODONE HCL/ACETAMINOPHEN [Percocet 5 MG/325 MG] 1 tab PO QID PRN #20 tablet


 PRN Reason: Moderate Pain (Pain Scale 4-6)


Warfarin Sodium [Coumadin] 5 mg PO DAILY@1700 #30 tablet


Complete Home Medications List: 


Complete Home Medication List:





Amitriptyline HCl [Elavil] 10 mg PO HS 03/09/18 


Copaxone 40 mg IM 3XW 03/09/18 


Acetaminophen [Tylenol] 1,000 mg PO Q6H PRN #30 tablet 03/12/18 


Ciprofloxacin HCl [Cipro] 500 mg PO BID #14 tablet 03/12/18 


Enoxaparin Sodium [Lovenox] 40 mg SC Q24H #7 disp.syrin 03/12/18 


Warfarin Sodium [Coumadin] 5 mg PO DAILY@1700 #30 tablet 03/12/18 


oxyCODONE HCL/ACETAMINOPHEN [Percocet 5 MG/325 MG] 1 tab PO QID PRN #20 tablet 

03/12/18 





Amb Orders for Discharge: 


Prothrombin Time Time Frame: 03/16/18, Location: Determined By Patient